# Patient Record
Sex: MALE | URBAN - METROPOLITAN AREA
[De-identification: names, ages, dates, MRNs, and addresses within clinical notes are randomized per-mention and may not be internally consistent; named-entity substitution may affect disease eponyms.]

---
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
## 2024-08-29 ENCOUNTER — ATHLETIC TRAINING SESSION (OUTPATIENT)
Dept: SPORTS MEDICINE | Facility: CLINIC | Age: 19
End: 2024-08-29

## 2024-08-29 DIAGNOSIS — Z00.00 HEALTHCARE MAINTENANCE: Primary | ICD-10-CM

## 2024-08-30 NOTE — PROGRESS NOTES
Reason for Encounter N/A    Subjective:       Chief Complaint: Brandon Barnes is a 19 y.o. male student at [employer]) who had concerns including Health Maintenance of the Left Upper Arm.    Jonas is a  at [employer]. He is coming in to do arm care biased on his shoulder screen.     Handedness: left-handed  Sport played: baseball      Level: college      Position:pitcher          ROS              Objective:       General: Brandon is well-developed, well-nourished, appears stated age, in no acute distress, alert and oriented to time, place and person.     AT Session          Assessment:     Status: F - Full Participation    Date Seen:  8/26/2024-9/1/2024    Date of Injury:  NA    Date Out:  NA    Date Cleared:  NA        Treatment/Rehab/Maintenance:   Brandon completed therapeutic exercises to develop Arm care:    Date: 08/28/2024    Exercises  Sets x Reps  Weight   Banded scap CARs  3 x 10  Black theratubbing    I,Y,T wall slides  3 x 10     Sleeper stretch  3 x 30s     Towel stretch  3 x 30s     OH press w/ march  3 x 10  7.5 lbs and green theraband    Supine pull down w/ heel tap  3 x 10  Red theraband    Dolphin push ups  3 x 10     Lunge to balance rows  3 x 10  Red theraband                         Brandon completed therapeutic exercises to develop Arm care:    Date: 08/27/2024    Exercises  Sets x Reps  Weight   Banded scap CARs  3 x 10  Black theratubbing    I,Y,T wall slides  3 x 10     Sleeper stretch  3 x 30s     Towel stretch  3 x 30s     OH press w/ march  3 x 10  7.5 lbs and green theraband    Supine pull down w/ heel tap  3 x 10  Red theraband    Dolphin push ups  3 x 10     Lunge to balance rows  3 x 10  Red theraband                         Post Throw  Date: 08/26/2024    Shoulder CARs x 15   Half kneeling T spine opener x 15 each side   Tea cups x 15   Diagonal pull a parts x 15   90/90 plyo ball catches x 15   Forward arm ball catches x 15   Lateral arm ball catches x 15        Pre-Throw  Date: 08/26/2024    I, Y, T, W with J bands 1 x 15   Throwing motion with J bands 1 x 15   Reverse throws 1 x 15   Pivot pick off holds 1 x 10   Roll in holds 1 x 10   Walking wind up 1 x 10       Plan:       1. Continue to do arm care daily.  2. Physician Referral: no  3. ED Referral:no  4. Parent/Guardian Notified: No  5. All questions were answered, ath. will contact me for questions or concerns in  the interim.  6.         Eligible to use School Insurance: No, not a school related injury

## 2024-09-14 ENCOUNTER — ATHLETIC TRAINING SESSION (OUTPATIENT)
Dept: SPORTS MEDICINE | Facility: CLINIC | Age: 19
End: 2024-09-14

## 2024-09-14 DIAGNOSIS — Z00.00 HEALTHCARE MAINTENANCE: Primary | ICD-10-CM

## 2024-09-14 NOTE — PROGRESS NOTES
Reason for Encounter N/A    Subjective:       Chief Complaint: Brandon Barnes is a 19 y.o. male student at [employer]) who had concerns including Health Maintenance of the Left Upper Arm.    Jonas is a  at [employer]. He is coming in to do arm care biased on his shoulder screen.     Handedness: left-handed  Sport played: baseball      Level: college      Position:pitcher          ROS              Objective:       General: Brandon is well-developed, well-nourished, appears stated age, in no acute distress, alert and oriented to time, place and person.     AT Session          Assessment:     Status: F - Full Participation    Date Seen:  9/8/2024-9/14/2024    Date of Injury:  NA    Date Out:  NA    Date Cleared:  NA        Treatment/Rehab/Maintenance:   Post Throw  Date: 9/14/2024    Shoulder CARs x 10  Half kneeling T spine opener x 10 each side   Tea cups x 10   Diagonal pull a parts x 10   90/90 plyo ball catches x 10   Forward arm ball catches x 10   Lateral arm ball catches x 10      Pre-Throw  Date: 9/14/2024    I, Y, T, W with J bands 1 x 5   Throwing motion with J bands 1 x 55   Reverse throws 1 x 5   Pivot pick off holds 1 x 5   Roll in holds 1 x 5   Walking wind up 1 x 5       Post Throw  Date: 9/8/2024    Shoulder CARs x 10  Half kneeling T spine opener x 10 each side   Tea cups x 10   Diagonal pull a parts x 10   90/90 plyo ball catches x 10   Forward arm ball catches x 10   Lateral arm ball catches x 10      Pre-Throw  Date: 9/8/2024    I, Y, T, W with J bands 1 x 5   Throwing motion with J bands 1 x 55   Reverse throws 1 x 5   Pivot pick off holds 1 x 5   Roll in holds 1 x 5   Walking wind up 1 x 5         Brandon completed therapeutic exercises to develop Arm care:    Date: 09/8/2024    Exercises  Sets x Reps  Weight   Banded scap CARs  3 x 10  Black theratubbing    I,Y,T wall slides  3 x 10     Sleeper stretch  3 x 30s     Towel stretch  3 x 30s     OH press w/ march  3 x 10  7.5 lbs  and green theraband    Supine pull down w/ heel tap  3 x 10  Red theraband    Dolphin push ups  3 x 10     Lunge to balance rows  3 x 10  Red theraband                   Modality: Cupping  Date 9/8/2024, Body Location L bicep, and Duration 5 min              Plan:       1. Continue to do arm care daily.  2. Physician Referral: no  3. ED Referral:no  4. Parent/Guardian Notified: No  5. All questions were answered, ath. will contact me for questions or concerns in  the interim.  6.         Eligible to use School Insurance: No, not a school related injury

## 2024-09-19 ENCOUNTER — ATHLETIC TRAINING SESSION (OUTPATIENT)
Dept: SPORTS MEDICINE | Facility: CLINIC | Age: 19
End: 2024-09-19

## 2024-09-19 DIAGNOSIS — Z00.00 HEALTHCARE MAINTENANCE: Primary | ICD-10-CM

## 2024-09-19 NOTE — PROGRESS NOTES
Reason for Encounter N/A    Subjective:       Chief Complaint: Brandon Barnes is a 19 y.o. male student at [employer]) who had concerns including Health Maintenance of the Left Upper Arm.    Jonas is a  at [employer]. He is coming in to do arm care biased on his shoulder screen.     Handedness: left-handed  Sport played: baseball      Level: college      Position:pitcher          ROS              Objective:       General: Brandon is well-developed, well-nourished, appears stated age, in no acute distress, alert and oriented to time, place and person.     AT Session          Assessment:     Status: F - Full Participation    Date Seen:  9/15/2024-9/21/2024    Date of Injury:  NA    Date Out:  NA    Date Cleared:  NA        Treatment/Rehab/Maintenance:   Brandon completed therapeutic exercises to develop Arm care:    Date: 09/20/2024    Exercises  Sets x Reps  Weight   Banded scap CARs  3 x 10  Black theratubbing    I,Y,T wall slides  3 x 10     Sleeper stretch  3 x 30s     Towel stretch  3 x 30s     OH press w/ march  3 x 10  7.5 lbs and green theraband    Supine pull down w/ heel tap  3 x 10  Red theraband    Dolphin push ups  3 x 10     Lunge to balance rows  3 x 10  Red theraband                         Brandon completed therapeutic exercises to develop Arm care:    Date: 09/18/2024    Exercises  Sets x Reps  Weight   Banded scap CARs  3 x 10  Black theratubbing    I,Y,T wall slides  3 x 10     Sleeper stretch  3 x 30s     Towel stretch  3 x 30s     OH press w/ march  3 x 10  7.5 lbs and green theraband    Supine pull down w/ heel tap  3 x 10  Red theraband    Dolphin push ups  3 x 10     Lunge to balance rows  3 x 10  Red theraband                         Plan:       1. Continue to do arm care daily.  2. Physician Referral: no  3. ED Referral:no  4. Parent/Guardian Notified: No  5. All questions were answered, ath. will contact me for questions or concerns in  the interim.  6.         Eligible to  use School Insurance: No, not a school related injury

## 2024-09-30 ENCOUNTER — ATHLETIC TRAINING SESSION (OUTPATIENT)
Dept: SPORTS MEDICINE | Facility: CLINIC | Age: 19
End: 2024-09-30

## 2024-09-30 DIAGNOSIS — Z00.00 HEALTHCARE MAINTENANCE: Primary | ICD-10-CM

## 2024-09-30 NOTE — PROGRESS NOTES
Reason for Encounter N/A    Subjective:       Chief Complaint: Brandon Barnes is a 19 y.o. male student at [employer]) who had concerns including Health Maintenance of the Left Upper Arm.    Jonas is a  at [employer], [place]. He is coming in to do arm care biased on his shoulder screen.     Handedness: left-handed  Sport played: baseball      Level: college      Position:pitcher          ROS              Objective:       General: Brandon is well-developed, well-nourished, appears stated age, in no acute distress, alert and oriented to time, place and person.     AT Session          Assessment:     Status: F - Full Participation    Date Seen:  9/22/2024-9/28/2024    Date of Injury:  NA    Date Out:  NA    Date Cleared:  NA        Treatment/Rehab/Maintenance:   Brandon completed therapeutic exercises to develop Arm care:    Date: 09/26/2024    Exercises  Sets x Reps  Weight   Banded scap CARs  3 x 10  Black theratubbing    I,Y,T wall slides  3 x 10     Sleeper stretch  3 x 30s     Towel stretch  3 x 30s     OH press w/ march  3 x 10  7.5 lbs and green theraband    Supine pull down w/ heel tap  3 x 10  Red theraband    Dolphin push ups  3 x 10     Lunge to balance rows  3 x 10  Red theraband                         Brandon completed therapeutic exercises to develop Arm care:    Date: 09/25/2024    Exercises  Sets x Reps  Weight   Banded scap CARs  3 x 10  Black theratubbing    I,Y,T wall slides  3 x 10     Sleeper stretch  3 x 30s     Towel stretch  3 x 30s     OH press w/ march  3 x 10  7.5 lbs and green theraband    Supine pull down w/ heel tap  3 x 10  Red theraband    Dolphin push ups  3 x 10     Lunge to balance rows  3 x 10  Red theraband                   Modality: Cupping  Date 9/25/2024, Body Location L bicep, and Duration 5 min            Post Throw  Date: 9/24/2024    Shoulder CARs x 10  Half kneeling T spine opener x 10 each side   Half Kneeling T spine CARs x 10 each side   Cat cows x 10   Tea  cups x 10   Diagonal pull a parts x 10   90/90 plyo ball catches x 10   Forward arm ball catches x 10   Lateral arm ball catches x 10      Pre-Throw  Date: 9/24/2024    I, Y, T, W with J bands 1 x 5   Throwing motion with J bands 1 x 55   Reverse throws 1 x 5   Pivot pick off holds 1 x 5   Roll in holds 1 x 5   Walking wind up 1 x 5       Plan:       1. Continue to do arm care daily.  2. Physician Referral: no  3. ED Referral:no  4. Parent/Guardian Notified: No  5. All questions were answered, ath. will contact me for questions or concerns in  the interim.  6.         Eligible to use School Insurance: No, not a school related injury

## 2024-10-04 ENCOUNTER — ATHLETIC TRAINING SESSION (OUTPATIENT)
Dept: SPORTS MEDICINE | Facility: CLINIC | Age: 19
End: 2024-10-04

## 2024-10-04 DIAGNOSIS — Z00.00 HEALTHCARE MAINTENANCE: Primary | ICD-10-CM

## 2024-10-04 NOTE — PROGRESS NOTES
Reason for Encounter N/A    Subjective:       Chief Complaint: Brandon Barnes is a 19 y.o. male student at [employer]) who had concerns including Health Maintenance of the Left Upper Arm.    Jonas is a  at [employer]. He is coming in to do arm care biased on his shoulder screen.     Handedness: left-handed  Sport played: baseball      Level: college      Position:pitcher          ROS              Objective:       General: Brandon is well-developed, well-nourished, appears stated age, in no acute distress, alert and oriented to time, place and person.     AT Session          Assessment:     Status: F - Full Participation    Date Seen:  9/29/2024-10/5/2024    Date of Injury:  NA    Date Out:  NA    Date Cleared:  NA        Treatment/Rehab/Maintenance:   Post Throw  Date: 10/5/2024    Shoulder CARs x 10  Half kneeling T spine opener x 10 each side   Half Kneeling T spine CARs x 10 each side   Cat cows x 10   Tea cups x 10   Diagonal pull a parts x 10   90/90 plyo ball catches x 10   Forward arm ball catches x 10   Lateral arm ball catches x 10      Pre-Throw  Date: 10/5/2024    I, Y, T, W with J bands 1 x 5   Throwing motion with J bands 1 x 55   Reverse throws 1 x 5   Pivot pick off holds 1 x 5   Roll in holds 1 x 5   Walking wind up 1 x 5       Jonas completed therapeutic exercises to develop Arm care:    Date: 10/3/2024    Exercises  Sets x Reps  Weight   Banded scap CARs  3 x 10  Black theratubbing    I,Y,T wall slides  3 x 10     Sleeper stretch  3 x 30s     Towel stretch  3 x 30s     Supine pull down w/ heel tap  3 x 10  Red theraband                             Modality: Normatec  Date 10/3/2024, Arms, and Duration 15 min              Jonas completed therapeutic exercises to develop Arm care:    Date: 10/2/2024    Exercises  Sets x Reps  Weight   Banded scap CARs  3 x 10  Black theratubbing    I,Y,T wall slides  3 x 10     Sleeper stretch  3 x 30s     Towel stretch  3 x 30s     OH press  w/ march  3 x 10  7.5 lbs and green theraband    Supine pull down w/ heel tap  3 x 10  Red theraband    Dolphin push ups  3 x 10     Lunge to balance rows  3 x 10  Red theraband                         Jonas completed therapeutic exercises to develop Arm care:    Date: 10/1/2024    Exercises  Sets x Reps  Weight   Banded scap CARs  3 x 10  Black theratubbing    I,Y,T wall slides  3 x 10     Sleeper stretch  3 x 30s     Towel stretch  3 x 30s     OH press w/ march  3 x 10  7.5 lbs and green theraband    Supine pull down w/ heel tap  3 x 10  Red theraband    Dolphin push ups  3 x 10     Lunge to balance rows  3 x 10  Red theraband                       Post Throw  Date: 10/1/2024    Shoulder CARs x 10  Half kneeling T spine opener x 10 each side   Half Kneeling T spine CARs x 10 each side   Cat cows x 10   Tea cups x 10   Diagonal pull a parts x 10   90/90 plyo ball catches x 10   Forward arm ball catches x 10   Lateral arm ball catches x 10      Pre-Throw  Date: 10/1/2024    I, Y, T, W with J bands 1 x 5   Throwing motion with J bands 1 x 55   Reverse throws 1 x 5   Pivot pick off holds 1 x 5   Roll in holds 1 x 5   Walking wind up 1 x 5       Plan:       1. Continue to do arm care daily.  2. Physician Referral: no  3. ED Referral:no  4. Parent/Guardian Notified: No  5. All questions were answered, ath. will contact me for questions or concerns in  the interim.  6.         Eligible to use School Insurance: No, not a school related injury

## 2024-10-12 ENCOUNTER — ATHLETIC TRAINING SESSION (OUTPATIENT)
Dept: SPORTS MEDICINE | Facility: CLINIC | Age: 19
End: 2024-10-12

## 2024-10-12 DIAGNOSIS — Z00.00 HEALTHCARE MAINTENANCE: Primary | ICD-10-CM

## 2024-10-21 ENCOUNTER — ATHLETIC TRAINING SESSION (OUTPATIENT)
Dept: SPORTS MEDICINE | Facility: CLINIC | Age: 19
End: 2024-10-21

## 2024-10-21 DIAGNOSIS — Z00.00 HEALTHCARE MAINTENANCE: Primary | ICD-10-CM

## 2024-10-21 NOTE — PROGRESS NOTES
Reason for Encounter N/A    Subjective:       Chief Complaint: Brandon Barnes is a 19 y.o. male student at [employer]) who had concerns including Health Maintenance of the Left Upper Arm.    Jonas is a  at [employer]. He is coming in to do arm care biased on his shoulder screen.     Handedness: left-handed  Sport played: baseball      Level: college      Position:pitcher          ROS              Objective:       General: Brandon is well-developed, well-nourished, appears stated age, in no acute distress, alert and oriented to time, place and person.     AT Session          Assessment:     Status: F - Full Participation    Date Seen:  10/13/2024-10/16/2024    Date of Injury:  NA    Date Out:  NA    Date Cleared:  NA        Treatment/Rehab/Maintenance:   Jonas completed therapeutic exercises to develop Arm care:    Date: 10/17/2024    Exercises  Sets x Reps  Weight   Banded scap CARs  3 x 10  Black theratubbing    I,Y,T wall slides  3 x 10     Sleeper stretch  3 x 30s     Towel stretch  3 x 30s     OH press w/ march  3 x 10  7.5 lbs and green theraband    Supine pull down w/ heel tap  3 x 10  Red theraband                                   Post Throw  Date: 10/16/2024    Shoulder CARs x 10  Half kneeling T spine opener x 10 each side   Half Kneeling T spine CARs x 10 each side   Cat cows x 10   Tea cups x 10   Diagonal pull a parts x 10   90/90 plyo ball catches x 10   Forward arm ball catches x 10   Lateral arm ball catches x 10     Pre-Throw  Date: 10/16/2024    I, Y, T, W with J bands 1 x 5   Throwing motion with J bands 1 x 55   Reverse throws 1 x 5   Pivot pick off holds 1 x 5   Roll in holds 1 x 5   Walking wind up 1 x 5         Plan:       1. Continue to do arm care 3x a week from now till January.   2. Physician Referral: no  3. ED Referral:no  4. Parent/Guardian Notified: No  5. All questions were answered, ath. will contact me for questions or concerns in  the interim.  6.          Eligible to use School Insurance: No, not a school related injury

## 2024-10-25 ENCOUNTER — ATHLETIC TRAINING SESSION (OUTPATIENT)
Dept: SPORTS MEDICINE | Facility: CLINIC | Age: 19
End: 2024-10-25

## 2024-10-25 DIAGNOSIS — Z00.00 HEALTHCARE MAINTENANCE: Primary | ICD-10-CM

## 2024-10-25 NOTE — PROGRESS NOTES
Reason for Encounter N/A    Subjective:       Chief Complaint: Brandon Barnes is a 19 y.o. male student at [employer]) who had concerns including Health Maintenance of the Left Upper Arm.    Jonas is a  at [employer]. He is coming in to do arm care biased on his shoulder screen.     Handedness: left-handed  Sport played: baseball      Level: college      Position:pitcher          ROS              Objective:       General: Brandon is well-developed, well-nourished, appears stated age, in no acute distress, alert and oriented to time, place and person.     AT Session          Assessment:     Status: F - Full Participation    Date Seen:  10/20/2024-10/23/2024    Date of Injury:  NA    Date Out:  NA    Date Cleared:  NA        Treatment/Rehab/Maintenance:       Brandon completed therapeutic exercises to develop arm care:    Date: 10/23/2024    Exercises  Sets x Reps  Weight   Shoulder CARs 3 x 10     Shoulder to ground  3 x 10     KB halos  3 x 10  10 lbs    Plank saws  3 x 10     Band I, Y, T w/ leg extension  3 x 10  Red theraband    Scapular foam roller to cobra  3 x 10     Downward dog taps 3 x 10     Cobra on wall  3 x 30s    Kneeling quad rocks 2 x 10    Hamstring floss  2 x 10               Plan:       1. Continue to do arm care 3x a week from now till January.   2. Physician Referral: no  3. ED Referral:no  4. Parent/Guardian Notified: No  5. All questions were answered, ath. will contact me for questions or concerns in  the interim.  6.         Eligible to use School Insurance: No, not a school related injury

## 2024-11-01 ENCOUNTER — ATHLETIC TRAINING SESSION (OUTPATIENT)
Dept: SPORTS MEDICINE | Facility: CLINIC | Age: 19
End: 2024-11-01

## 2024-11-01 DIAGNOSIS — Z00.00 HEALTHCARE MAINTENANCE: Primary | ICD-10-CM

## 2024-11-08 ENCOUNTER — ATHLETIC TRAINING SESSION (OUTPATIENT)
Dept: SPORTS MEDICINE | Facility: CLINIC | Age: 19
End: 2024-11-08

## 2024-11-08 DIAGNOSIS — Z00.00 HEALTHCARE MAINTENANCE: Primary | ICD-10-CM

## 2024-11-08 NOTE — PROGRESS NOTES
Reason for Encounter N/A    Subjective:       Chief Complaint: Brandon Barnes is a 19 y.o. male student at [employer]) who had concerns including Health Maintenance of the Left Upper Arm.    Jonas is a  at [employer]. He is coming in to do arm care biased on his shoulder screen.     Handedness: left-handed  Sport played: baseball      Level: college      Position:pitcher          ROS              Objective:       General: Brandon is well-developed, well-nourished, appears stated age, in no acute distress, alert and oriented to time, place and person.     AT Session          Assessment:     Status: F - Full Participation    Date Seen:  11/3/2024-11/9/2024    Date of Injury:  NA    Date Out:  NA    Date Cleared:  NA        Treatment/Rehab/Maintenance:   Brandon completed therapeutic exercises to develop arm care:    Date: 11/8/2024    Exercises  Sets x Reps  Weight   Shoulder CARs 3 x 10     Shoulder to ground  3 x 10     Plank saws 3 x 10     Band I, Y, T with leg extension  3 x 10  Red theraband    KB halos  3 x 10  10 lbs    Scapular foam roller to cobra  3 x 10     Downward dog taps 3 x 10     Cobra on wall  3 x 30s    Kneeling quad rocks 2 x 10    Hamstring floss  2 x 10             Brandon completed therapeutic exercises to develop arm care:    Date: 11/7/2024    Exercises  Sets x Reps  Weight   Shoulder CARs 3 x 10     Shoulder to ground  3 x 10     KB halos  3 x 10  10 lbs    Scapular foam roller to cobra  3 x 10     Downward dog taps 3 x 10     Cobra on wall  3 x 30s    Kneeling quad rocks 2 x 10    Hamstring floss  2 x 10             Brandon completed therapeutic exercises to develop arm care:    Date: 11/4/2024    Exercises  Sets x Reps  Weight   Shoulder CARs 3 x 10     Shoulder to ground  3 x 10     KB halos  3 x 10  10 lbs    Scapular foam roller to cobra  3 x 10     Downward dog taps 3 x 10     Cobra on wall  3 x 30s    Kneeling quad rocks 2 x 10    Hamstring floss  2 x 10                Plan:       1. Continue to do arm care 3x a week from now till January. Improve compliance next week and forward.   2. Physician Referral: no  3. ED Referral:no  4. Parent/Guardian Notified: No  5. All questions were answered, ath. will contact me for questions or concerns in  the interim.  6.         Eligible to use School Insurance: No, not a school related injury

## 2024-11-18 ENCOUNTER — ATHLETIC TRAINING SESSION (OUTPATIENT)
Dept: SPORTS MEDICINE | Facility: CLINIC | Age: 19
End: 2024-11-18

## 2024-11-18 DIAGNOSIS — Z00.00 HEALTHCARE MAINTENANCE: Primary | ICD-10-CM

## 2024-11-18 NOTE — PROGRESS NOTES
Reason for Encounter N/A    Subjective:       Chief Complaint: Brandon Barnes is a 19 y.o. male student at [employer]) who had concerns including Health Maintenance of the Left Upper Arm.    Jonas is a  at [employer]. He is coming in to do arm care biased on his shoulder screen.     Handedness: left-handed  Sport played: baseball      Level: college      Position:pitcher          ROS              Objective:       General: Brandon is well-developed, well-nourished, appears stated age, in no acute distress, alert and oriented to time, place and person.     AT Session          Assessment:     Status: F - Full Participation    Date Seen:  11/10/2024-11/16/2024    Date of Injury:  NA    Date Out:  NA    Date Cleared:  NA        Treatment/Rehab/Maintenance:     Brandon completed therapeutic exercises to develop arm care:    Date: 11/15/2024    Exercises  Sets x Reps  Weight   Shoulder CARs 3 x 10     Shoulder to ground  3 x 10     KB halos  3 x 10  10 lbs    Scapular foam roller to cobra  3 x 10     Downward dog taps 3 x 10     Cobra on wall  3 x 30s    Kneeling quad rocks 2 x 10    Hamstring floss  2 x 10             Brandon completed therapeutic exercises to develop arm care:    Date: 11/14/2024    Exercises  Sets x Reps  Weight   Shoulder CARs 3 x 10     Shoulder to ground  3 x 10     KB halos  3 x 10  10 lbs    Scapular foam roller to cobra  3 x 10     Downward dog taps 3 x 10     Cobra on wall  3 x 30s    Kneeling quad rocks 2 x 10    Hamstring floss  2 x 10               Plan:       1. Continue to do arm care 3x a week from now till January. Improve compliance next week and forward.   2. Physician Referral: no  3. ED Referral:no  4. Parent/Guardian Notified: No  5. All questions were answered, ath. will contact me for questions or concerns in  the interim.  6.         Eligible to use School Insurance: No, not a school related injury

## 2024-11-25 ENCOUNTER — ATHLETIC TRAINING SESSION (OUTPATIENT)
Dept: SPORTS MEDICINE | Facility: CLINIC | Age: 19
End: 2024-11-25

## 2024-11-25 DIAGNOSIS — Z00.00 HEALTHCARE MAINTENANCE: Primary | ICD-10-CM

## 2024-11-25 NOTE — PROGRESS NOTES
Reason for Encounter N/A    Subjective:       Chief Complaint: Brandon Barnes is a 19 y.o. male student at [employer]) who had concerns including Health Maintenance of the Left Upper Arm.    Jonas is a  at [employer]. He is coming in to do arm care biased on his shoulder screen.     Handedness: left-handed  Sport played: baseball      Level: college      Position:pitcher          ROS              Objective:       General: Brandon is well-developed, well-nourished, appears stated age, in no acute distress, alert and oriented to time, place and person.     AT Session          Assessment:     Status: F - Full Participation    Date Seen:  11/17/2024-11/23/2024    Date of Injury:  NA    Date Out:  NA    Date Cleared:  NA        Treatment/Rehab/Maintenance:   Brandon completed therapeutic exercises to develop arm care:    Date: 11/21/2024    Exercises  Sets x Reps  Weight   Shoulder CARs 3 x 10     Shoulder to ground  3 x 10     KB halos  3 x 10  10 lbs    Band I, Y, T with leg extension  3 x 10  Red theraband    Plank saws  3 x 10     Scapular foam roller to cobra  3 x 10     Downward dog taps 3 x 10     Cobra on wall  3 x 30s    Kneeling quad rocks 2 x 10    Hamstring floss  2 x 10             Brandon completed therapeutic exercises to develop arm care:    Date: 11/20/2024    Exercises  Sets x Reps  Weight   Shoulder CARs 3 x 10     Shoulder to ground  3 x 10     KB halos  3 x 10  10 lbs    Band I, Y, T with leg extension  3 x 10  Red theraband    Plank saws  3 x 10     Scapular foam roller to cobra  3 x 10     Downward dog taps 3 x 10     Cobra on wall  3 x 30s    Kneeling quad rocks 2 x 10    Hamstring floss  2 x 10             Brandon completed therapeutic exercises to develop arm care:    Date: 11/19/2024    Exercises  Sets x Reps  Weight   Shoulder CARs 3 x 10     Shoulder to ground  3 x 10     KB halos  3 x 10  10 lbs    Band I, Y, T with leg extension  3 x 10  Red theraband    Scapular foam roller  to cobra  3 x 10     Downward dog taps 3 x 10     Cobra on wall  3 x 30s    Kneeling quad rocks 2 x 10    Hamstring floss  2 x 10               Plan:       1. Continue to do arm care 3x a week from now till January. Improve compliance next week and forward.   2. Physician Referral: no  3. ED Referral:no  4. Parent/Guardian Notified: No  5. All questions were answered, ath. will contact me for questions or concerns in  the interim.  6.         Eligible to use School Insurance: No, not a school related injury

## 2025-02-04 ENCOUNTER — ATHLETIC TRAINING SESSION (OUTPATIENT)
Dept: SPORTS MEDICINE | Facility: CLINIC | Age: 20
End: 2025-02-04

## 2025-02-04 DIAGNOSIS — Z00.00 HEALTHCARE MAINTENANCE: Primary | ICD-10-CM

## 2025-02-04 NOTE — PROGRESS NOTES
Reason for Encounter N/A    Subjective:       Chief Complaint: Brandon Barnes is a 19 y.o. male student at [employer]) who had concerns including Health Maintenance of the Left Upper Arm.    Jonas is a  at [employer]. He is coming in to do arm care designed from his pre season arm screening.     Handedness: left-handed  Sport played: baseball      Level: college      Position:pitcher          ROS              Objective:       General: Brandon is well-developed, well-nourished, appears stated age, in no acute distress, alert and oriented to time, place and person.     AT Session          Assessment:     Status: F - Full Participation    Date Seen:  01/26/2025-02/01/2025    Date of Injury:  NA    Date Out:  NA    Date Cleared:  NA        Treatment/Rehab/Maintenance:   Brandon completed therapeutic exercises to develop strength and flexibility:    Date: 02/01/2025    Exercises  Sets x Reps  Weight   Pass throughs  2 x 10     KB halos  2 x 10  15 lbs    Band butterfly stretch  3 x 30s  Orange heavy resistance band    Quadruped KB CARs 3 x 10  15 lbs    6 in hold with arm pulses  3 x 12  3 lbs    SL bridge with pec fly  3 x 12  3 lbs    Alternating leg crunch with snow angle  3 x 12  3 lbs    Swimmer pulses  3 x 12  3 lbs    Reclined twist w/ hamstring stretch  2 x 10     Figure 4 with hamstring floss  2 x 10     Kneeling quad rocks  2 x 10     Modality: Cupping  Date 02/01/2025, Body Location L bicep, and Duration 5 min            Post Throw  Date: 01/31/2025    Shoulder CARs x 10  Half kneeling T spine opener x 10 each side   Half Kneeling T spine CARs x 10 each side   Cat cows x 10   Tea cups x 10   Diagonal pull a parts x 10   90/90 plyo ball catches x 10   Forward arm ball catches x 10   Lateral arm ball catches x 10      Pre-Throw  Date: 01/31/2025    I, Y, T, W with J bands 1 x 5   Throwing motion with J bands 1 x 55   Reverse throws 1 x 5   Pivot pick off holds 1 x 5   Roll in holds 1 x 5    Walking wind up 1 x 5       Post Throw  Date: 01/29/2025    Shoulder CARs x 10  Half kneeling T spine opener x 10 each side   Half Kneeling T spine CARs x 10 each side   Cat cows x 10   Tea cups x 10   Diagonal pull a parts x 10   90/90 plyo ball catches x 10   Forward arm ball catches x 10   Lateral arm ball catches x 10      Pre-Throw  Date: 01/29/2025    I, Y, T, W with J bands 1 x 5   Throwing motion with J bands 1 x 55   Reverse throws 1 x 5   Pivot pick off holds 1 x 5   Roll in holds 1 x 5   Walking wind up 1 x 5     Plan:       1. Come in [facility] for daily arm care and do pre/post throw on pitching days.   2. Physician Referral: no  3. ED Referral:no  4. Parent/Guardian Notified: No  5. All questions were answered, ath. will contact me for questions or concerns in  the interim.  6.         Eligible to use School Insurance: Yes

## 2025-02-12 ENCOUNTER — ATHLETIC TRAINING SESSION (OUTPATIENT)
Dept: SPORTS MEDICINE | Facility: CLINIC | Age: 20
End: 2025-02-12

## 2025-02-12 DIAGNOSIS — Z00.00 HEALTHCARE MAINTENANCE: Primary | ICD-10-CM

## 2025-02-12 NOTE — PROGRESS NOTES
Reason for Encounter N/A    Subjective:       Chief Complaint: Brandon Barnes is a 19 y.o. male student at [employer]) who had concerns including Health Maintenance of the Left Upper Arm.    Jonas is a  at [employer]. He is coming in to do arm care designed from his pre season arm screening.     Handedness: left-handed  Sport played: baseball      Level: college      Position:pitcher          ROS              Objective:       General: Brandon is well-developed, well-nourished, appears stated age, in no acute distress, alert and oriented to time, place and person.     AT Session          Assessment:     Status: F - Full Participation    Date Seen:  02/02/2025-02/08/2025    Date of Injury:  NA    Date Out:  NA    Date Cleared:  NA        Treatment/Rehab/Maintenance:   Brandon completed therapeutic exercises to develop strength and flexibility:    Date: 02/06/2025    Exercises  Sets x Reps  Weight   Pass throughs  2 x 10     KB halos  2 x 10  15 lbs    Quadruped KB CARs 3 x 10  15 lbs    Reclined twist w/ hamstring stretch  2 x 10     Figure 4 with hamstring floss  2 x 10     Kneeling quad rocks  2 x 10           Brandon completed therapeutic exercises to develop strength and flexibility:    Date: 02/04/2025    Exercises  Sets x Reps  Weight   Pass throughs  2 x 10     KB halos  2 x 10  15 lbs    Band butterfly stretch  3 x 30s  Orange heavy resistance band    Quadruped KB CARs 3 x 10  15 lbs    6 in hold with arm pulses  3 x 12  3 lbs    SL bridge with pec fly  3 x 12  3 lbs    Alternating leg crunch with snow angle  3 x 12  3 lbs    Swimmer pulses  3 x 12  3 lbs    Reclined twist w/ hamstring stretch  2 x 10     Figure 4 with hamstring floss  2 x 10     Kneeling quad rocks  2 x 10         Brandon completed therapeutic exercises to develop strength and flexibility:    Date: 02/03/2025    Exercises  Sets x Reps  Weight   Pass throughs  2 x 10     KB halos  2 x 10  15 lbs    Band butterfly stretch  3 x  30s  Orange heavy resistance band    Quadruped KB CARs 3 x 10  15 lbs    6 in hold with arm pulses  3 x 12  3 lbs    SL bridge with pec fly  3 x 12  3 lbs    Alternating leg crunch with snow angle  3 x 12  3 lbs    Swimmer pulses  3 x 12  3 lbs    Reclined twist w/ hamstring stretch  2 x 10     Figure 4 with hamstring floss  2 x 10     Kneeling quad rocks  2 x 10     Modality: Compex  Date 02/03/2025, Competition Recovery , Body Location L bicep, and Duration 25 min              Plan:       1. Come in [facility] for daily arm care and do pre/post throw on pitching days.   2. Physician Referral: no  3. ED Referral:no  4. Parent/Guardian Notified: No  5. All questions were answered, ath. will contact me for questions or concerns in  the interim.  6.         Eligible to use School Insurance: Yes

## 2025-03-26 ENCOUNTER — ATHLETIC TRAINING SESSION (OUTPATIENT)
Dept: SPORTS MEDICINE | Facility: CLINIC | Age: 20
End: 2025-03-26

## 2025-03-26 DIAGNOSIS — Z00.00 HEALTHCARE MAINTENANCE: Primary | ICD-10-CM

## 2025-03-26 NOTE — PROGRESS NOTES
Reason for Encounter N/A    Subjective:       Chief Complaint: Brandon Barnes is a 19 y.o. male student at [employer]) who had concerns including Health Maintenance of the Left Upper Arm.    Jonas is a  at [employer]. He is coming in to do arm care designed from his pre season arm screening.     Handedness: left-handed  Sport played: baseball      Level: college      Position:pitcher          ROS              Objective:       General: Brandon is well-developed, well-nourished, appears stated age, in no acute distress, alert and oriented to time, place and person.     AT Session          Assessment:     Status: F - Full Participation    Date Seen:  03/16/2025-03/22/2025    Date of Injury:  NA    Date Out:  NA    Date Cleared:  NA        Treatment/Rehab/Maintenance:       Brandon completed therapeutic exercises to develop strength and flexibility:    Date: 03/21/2025    Exercises  Sets x Reps  Weight   Pass throughs  2 x 10     KB halos  2 x 10  15 lbs    Band butterfly stretch  3 x 30s  Orange heavy resistance band    Quadruped KB CARs 3 x 10  15 lbs    6 in hold with arm pulses  3 x 12  3 lbs    SL bridge with pec fly  3 x 12  3 lbs    Alternating leg crunch with snow angle  3 x 12  3 lbs    Swimmer pulses  3 x 12  3 lbs    Reclined twist w/ hamstring stretch  2 x 10     Figure 4 with hamstring floss  2 x 10     Kneeling quad rocks  2 x 10         Post Throw  Date: 03/18/2025    Shoulder CARs x 10  Half kneeling T spine opener x 10 each side   Half Kneeling T spine CARs x 10 each side   Cat cows x 10   Tea cups x 10   Diagonal pull a parts x 10   90/90 plyo ball catches x 10   Forward arm ball catches x 10   Lateral arm ball catches x 10      Pre-Throw  Date: 3/18/2025    I, Y, T, W with J bands 1 x 5   Throwing motion with J bands 1 x 55   Reverse throws 1 x 5   Pivot pick off holds 1 x 5   Roll in holds 1 x 5   Walking wind up 1 x 5         Plan:       1. Come in ATR for daily arm care and do  pre/post throw on pitching days.   2. Physician Referral: no  3. ED Referral:no  4. Parent/Guardian Notified: No  5. All questions were answered, ath. will contact me for questions or concerns in  the interim.  6.         Eligible to use School Insurance: Yes

## 2025-03-26 NOTE — PROGRESS NOTES
Reason for Encounter N/A    Subjective:       Chief Complaint: Brandon Barnes is a 19 y.o. male student at [employer]) who had concerns including Health Maintenance of the Left Upper Arm.    Jonas is a  at [employer]. He is coming in to do arm care designed from his pre season arm screening.     Handedness: left-handed  Sport played: baseball      Level: college      Position:pitcher          ROS              Objective:       General: Brandon is well-developed, well-nourished, appears stated age, in no acute distress, alert and oriented to time, place and person.     AT Session          Assessment:     Status: F - Full Participation    Date Seen:  03/09/2025-03/15/2025    Date of Injury:  NA    Date Out:  NA    Date Cleared:  NA        Treatment/Rehab/Maintenance:   Pre-Throw  Date: 03/14/2025    I, Y, T, W with J bands 1 x 5   Throwing motion with J bands 1 x 55   Reverse throws 1 x 5   Pivot pick off holds 1 x 5   Roll in holds 1 x 5   Walking wind up 1 x 5        Brandon completed therapeutic exercises to develop strength and flexibility:    Date: 03/11/2025    Exercises  Sets x Reps  Weight   Pass throughs  2 x 10     KB halos  2 x 10  15 lbs    Band butterfly stretch  3 x 30s  Orange heavy resistance band    Quadruped KB CARs 3 x 10  15 lbs    6 in hold with arm pulses  3 x 12  3 lbs    SL bridge with pec fly  3 x 12  3 lbs    Alternating leg crunch with snow angle  3 x 12  3 lbs    Swimmer pulses  3 x 12  3 lbs    Reclined twist w/ hamstring stretch  2 x 10     Figure 4 with hamstring floss  2 x 10     Kneeling quad rocks  2 x 10               Plan:       1. Come in ATR for daily arm care and do pre/post throw on pitching days.   2. Physician Referral: no  3. ED Referral:no  4. Parent/Guardian Notified: No  5. All questions were answered, ath. will contact me for questions or concerns in  the interim.  6.         Eligible to use School Insurance: Yes

## 2025-03-30 ENCOUNTER — ATHLETIC TRAINING SESSION (OUTPATIENT)
Dept: SPORTS MEDICINE | Facility: CLINIC | Age: 20
End: 2025-03-30

## 2025-03-30 DIAGNOSIS — Z00.00 HEALTHCARE MAINTENANCE: Primary | ICD-10-CM

## 2025-03-30 NOTE — PROGRESS NOTES
Reason for Encounter N/A    Subjective:       Chief Complaint: Brandon Barnes is a 19 y.o. male student at [employer]) who had concerns including Health Maintenance of the Left Upper Arm.    Jonas is a  at [employer]. He is coming in to do arm care designed from his pre season arm screening.     Handedness: left-handed  Sport played: baseball      Level: college      Position:pitcher          ROS              Objective:       General: Brandon is well-developed, well-nourished, appears stated age, in no acute distress, alert and oriented to time, place and person.     AT Session          Assessment:     Status: F - Full Participation    Date Seen:  03/23/2025-03/29/2025    Date of Injury:  NA    Date Out:  NA    Date Cleared:  NA        Treatment/Rehab/Maintenance:       Brandon completed therapeutic exercises to develop strength and flexibility:    Date: 03/28/2025    Exercises  Sets x Reps  Weight   Pass throughs  2 x 10     KB halos  2 x 10  15 lbs    Band butterfly stretch  3 x 30s  Orange heavy resistance band    Quadruped KB CARs 3 x 10  15 lbs    6 in hold with arm pulses  3 x 12  3 lbs    SL bridge with pec fly  3 x 12  3 lbs    Alternating leg crunch with snow angle  3 x 12  3 lbs    Swimmer pulses  3 x 12  3 lbs    Reclined twist w/ hamstring stretch  2 x 10     Figure 4 with hamstring floss  2 x 10     Kneeling quad rocks  2 x 10         Post Throw  Date: 03/27/2025    Shoulder CARs x 10  Half kneeling T spine opener x 10 each side   Half Kneeling T spine CARs x 10 each side   Cat cows x 10   Tea cups x 10   Diagonal pull a parts x 10   90/90 plyo ball catches x 10   Forward arm ball catches x 10   Lateral arm ball catches x 10      Pre-Throw  Date: 3/27/2025    I, Y, T, W with J bands 1 x 5   Throwing motion with J bands 1 x 55   Reverse throws 1 x 5   Pivot pick off holds 1 x 5   Roll in holds 1 x 5   Walking wind up 1 x 5       Brandon completed therapeutic exercises to develop  strength and flexibility:    Date: 03/26/2025    Exercises  Sets x Reps  Weight   Pass throughs  2 x 10     KB halos  2 x 10  15 lbs    Band butterfly stretch  3 x 30s  Orange heavy resistance band    Quadruped KB CARs 3 x 10  15 lbs    6 in hold with arm pulses  3 x 12  3 lbs    SL bridge with pec fly  3 x 12  3 lbs    Alternating leg crunch with snow angle  3 x 12  3 lbs    Swimmer pulses  3 x 12  3 lbs                       Plan:       1. Come in ATR for daily arm care and do pre/post throw on pitching days.   2. Physician Referral: no  3. ED Referral:no  4. Parent/Guardian Notified: No  5. All questions were answered, ath. will contact me for questions or concerns in  the interim.  6.         Eligible to use School Insurance: Yes

## 2025-05-30 ENCOUNTER — ATHLETIC TRAINING SESSION (OUTPATIENT)
Dept: SPORTS MEDICINE | Facility: CLINIC | Age: 20
End: 2025-05-30

## 2025-05-30 NOTE — PROGRESS NOTES
Despite efforts to reach out Jonas did not complete and exit physical for the 2025 baseball season. All injuries and issues are considered resolved. There are no injuries to be considered school related.